# Patient Record
Sex: MALE | Race: WHITE | NOT HISPANIC OR LATINO
[De-identification: names, ages, dates, MRNs, and addresses within clinical notes are randomized per-mention and may not be internally consistent; named-entity substitution may affect disease eponyms.]

---
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
## 2022-04-23 NOTE — ED ADULT NURSE NOTE - CINV DISCH TEACH PARTICIP
[address]  [address] [facility] [address]  [phone]    Work/School Note    Date: 4/23/2022    To Whom It May concern:    Matthew Shi was seen and treated today in the emergency room by the following provider(s):  Attending Provider: Mel Burger MD  Nurse Practitioner: Ketan Geiger NP. Matthew Shi is excused from work/school on 4/23/2022 through 4/25/2022. He is medically clear to return to work/school on 4/26/2022.          Sincerely,          Tracee Montiel NP Patient

## 2022-08-10 ENCOUNTER — EMERGENCY (EMERGENCY)
Facility: HOSPITAL | Age: 22
LOS: 0 days | Discharge: HOME | End: 2022-08-10
Attending: EMERGENCY MEDICINE | Admitting: EMERGENCY MEDICINE

## 2022-08-10 VITALS
DIASTOLIC BLOOD PRESSURE: 71 MMHG | TEMPERATURE: 99 F | RESPIRATION RATE: 17 BRPM | HEART RATE: 96 BPM | OXYGEN SATURATION: 98 % | WEIGHT: 149.03 LBS | SYSTOLIC BLOOD PRESSURE: 119 MMHG
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
## 2022-08-10 DIAGNOSIS — S43.014A ANTERIOR DISLOCATION OF RIGHT HUMERUS, INITIAL ENCOUNTER: ICD-10-CM

## 2022-08-10 DIAGNOSIS — M25.511 PAIN IN RIGHT SHOULDER: ICD-10-CM

## 2022-08-10 PROCEDURE — 73030 X-RAY EXAM OF SHOULDER: CPT | Mod: 26,RT,76

## 2022-08-10 PROCEDURE — 99284 EMERGENCY DEPT VISIT MOD MDM: CPT | Mod: 57

## 2022-08-10 PROCEDURE — 23650 CLTX SHO DSLC W/MNPJ WO ANES: CPT | Mod: 54

## 2022-08-10 RX ORDER — MORPHINE SULFATE 50 MG/1
6 CAPSULE, EXTENDED RELEASE ORAL ONCE
Refills: 0 | Status: DISCONTINUED | OUTPATIENT
Start: 2022-08-10 | End: 2022-08-10

## 2022-08-10 RX ADMIN — MORPHINE SULFATE 6 MILLIGRAM(S): 50 CAPSULE, EXTENDED RELEASE ORAL at 00:55

## 2022-08-10 RX ADMIN — MORPHINE SULFATE 6 MILLIGRAM(S): 50 CAPSULE, EXTENDED RELEASE ORAL at 01:25

## 2022-08-10 NOTE — ED PROVIDER NOTE - OBJECTIVE STATEMENT
22-year-old male with past medical history of 2 previous right shoulder dislocations presents to the ED complaining of moderate right shoulder pain.  Patient slipped getting out of a hot tub reached out to grab onto something with his right arm and suddenly felt a pop in his right shoulder.  Patient states symptoms are similar to previous shoulder dislocation.  Pain is throbbing, constant, worse with movement and mildly improved with rest.  He did not take any medications to improve his symptoms.  He denies other complaints. No fever, chills, numbness.

## 2022-08-10 NOTE — ED PROVIDER NOTE - CARE PROVIDERS DIRECT ADDRESSES
,[email or web address],[email or web address]

## 2022-08-10 NOTE — ED PROVIDER NOTE - CARE PROVIDER_API CALL
YOUR ORTHOPEDIC SURGEON,   Phone: (   )    -  Fax: (   )    -  Follow Up Time: 1-3 Days    Derek Goodman)  [facility]  [address] [place] [address]  Phone: [phone]  Fax: [phone]  Follow Up Time: 4-6 Days

## 2022-08-10 NOTE — ED PROVIDER NOTE - CLINICAL SUMMARY MEDICAL DECISION MAKING FREE TEXT BOX
R shoulder dislocation - intra-articular lidocaine, successful reduced, sling placed, post-redn xr, all results d/w pt & copies given, strict return precautions discussed, rec outpt Ortho f/u

## 2022-08-10 NOTE — ED PROVIDER NOTE - PATIENT PORTAL LINK FT
You can access the FollowMyHealth Patient Portal offered by [facility] by registering at the following website: [email or web address]. By joining [employer]’s FollowMyHealth portal, you will also be able to view your health information using other applications (apps) compatible with our system.

## 2022-08-10 NOTE — ED PROVIDER NOTE - NS ED ROS FT
Review of Systems  Constitutional:  No fever, chills.  MS:  No back pain. (+) R Shoulder pain  Skin:  No skin rash, pruritis, jaundice, or lesions.  Neuro:  No headache, dizziness, loss of sensation, or focal weakness.  No change in mental status.   Endocrine: No history of thyroid disease or diabetes.

## 2022-08-10 NOTE — ED PROVIDER NOTE - PHYSICAL EXAMINATION
VITAL SIGNS: I have reviewed nursing notes and confirm.  CONSTITUTIONAL: Well-developed; well-nourished; in no acute distress.  SKIN: Skin exam is warm and dry, no acute rash.  HEAD: Normocephalic; atraumatic.  EYES: PERRL, EOM intact; conjunctiva and sclera clear.  ENT: No nasal discharge; airway clear.   NECK: Supple; non tender.  CARD: Regular rate and rhythm.  RESP: Speaking in full sentences.   EXT: (+) TTP and limited ROM of R shoulder. Empty glenoid fossa--consistent with shoulder dislocation. No humeral shaft/elbow/forearm/wrist/hand TTP or deformity. Radial pulses 2+ B/L. Sensation intact.   NEURO: Alert, oriented. Grossly unremarkable. No focal deficits.

## 2022-08-10 NOTE — ED PROVIDER NOTE - NS ED ATTENDING STATEMENT MOD
This was a shared visit with the [place]. I reviewed and verified the documentation and independently performed the documented:

## 2022-08-10 NOTE — ED PEDIATRIC TRIAGE NOTE - WEIGHT KG
67.6

## 2022-08-12 ENCOUNTER — APPOINTMENT (OUTPATIENT)
Dept: ORTHOPEDIC SURGERY | Facility: CLINIC | Age: 22
End: 2022-08-12

## 2022-08-12 VITALS — WEIGHT: 147 LBS | HEIGHT: 68 IN | BODY MASS INDEX: 22.28 KG/M2

## 2022-08-12 PROCEDURE — 99213 OFFICE O/P EST LOW 20 MIN: CPT

## 2022-08-12 NOTE — PHYSICAL EXAM
[Normal Coordination] : normal coordination [Normal DTR UE/LE] : normal DTR UE/LE  [Normal Sensation] : normal sensation [Normal Mood and Affect] : normal mood and affect [Orientated] : orientated [Normal Skin] : normal skin [No Rash] : no rash [No Ulcers] : no ulcers [No Lesions] : no lesions [No obvious lymphadenopathy in areas examined] : no obvious lymphadenopathy in areas examined [Right] : right shoulder [] : tenderness to palpation [FreeTextEntry9] :   Range of motion and strength testing deferred due to recent dislocation.  full range of motion and 5/5 strength, neurovascularly intact distal to shoulder

## 2022-08-12 NOTE — ASSESSMENT
[FreeTextEntry1] :  discussed with patient and his mother that since this is his 3rd anterior shoulder dislocation with relatively minimal trauma that we need to get an updated  MRI and have him follow up with 1 of our surgeons to discuss possible surgical correction of the labral tear that is making him more predisposed to these anterior dislocations.  The stat MRI for traumatic tear of the rotator cuff/ worsening of tear to the labrum was ordered for authorization.  He will remain in the sling for the next 2 weeks.  He will come out of the sling few times a day for range of motion of the elbow hand and wrist.  The importance of no overhead movements pushing and pulling with the right upper extremity was reinforced.  He will follow up for surgical consult in the next 2-4 weeks in the next available appointment.  He will continue with OTC NSAIDs as needed for pain.  He expressed understanding and had   Additional questions or concerns.\par \par   Patient's mother is requesting that I call her with the MRI results as well as called the patient.  I can reach her at [phone]\par \par This visit was seen under the direct supervision of Dr. Serafin Kramer

## 2022-08-12 NOTE — DATA REVIEWED
[Right] : of the right [Shoulder] : shoulder [FreeTextEntry1] :  post reduction x-rays showed good anatomic alignment no acute fracture noted

## 2022-08-12 NOTE — HISTORY OF PRESENT ILLNESS
[de-identified] :  patient is a 22-year-old male presenting for evaluation of right shoulder dislocation.  On the 10th August 2022 he slipped while getting out of a hot tub and dislocated his right shoulder.  He was seen in the emergency roomWhere he was reduced successfully.  This is his 3rd shoulder dislocation.  He did have an MRI completed 2 years ago which showed a torn labrum which was treated conservatively.

## 2022-08-20 ENCOUNTER — APPOINTMENT (OUTPATIENT)
Dept: MRI IMAGING | Facility: CLINIC | Age: 22
End: 2022-08-20

## 2022-08-20 PROCEDURE — 73221 MRI JOINT UPR EXTREM W/O DYE: CPT | Mod: RT

## 2022-09-06 ENCOUNTER — APPOINTMENT (OUTPATIENT)
Dept: ORTHOPEDIC SURGERY | Facility: CLINIC | Age: 22
End: 2022-09-06

## 2022-09-06 PROCEDURE — 99204 OFFICE O/P NEW MOD 45 MIN: CPT | Mod: 57

## 2022-09-06 NOTE — HISTORY OF PRESENT ILLNESS
[de-identified] : Patient is here for evaluation of right shoulder pain\par Affecting quality of life\par Wakes up at night due to pain\par \par has had 3 dislocations and has needed reduction in ED\par \par NAD\par Right shoulder:\par TTP ant GH joint, bicipital groove\par FF 0-175\par ER 60\par IR T12\par 5/5 strength scapular abduction, ER, IR\par Pos Impingement\par Pos Cha\par Pos Cross Arm Adduction\par pos instability\par \par XRay right shoulder negative for fracture, dislocation, arthritis\par \par mri right shoulder\par IMPRESSION:\par 1. 1.5 cm osseous Hill-Sachs compression deformity with reactive marrow edema in the posterior humeral head. No current \par dislocation, fracture, or osseous Bankart injury.\par 2. Probable SLAP labral tear with partial avulsion also involving the posterior inferior labrum. This could be further evaluated \par with MR Arthrogram if clinically indicated.\par 3. Minimal inferior glenohumeral joint effusion. Trace biceps tenosynovitis. The rotator cuff is preserved.\par \par Plan\par went over findings\par explained the mri and tear and pathology and chance of recurrance\par since this has happened mutliple times, will proceed with surgery\par right shoulder arthroscopy, labral repair, plication, decompression\par \par Operative and nonoperative options discussed with patient. Surgical risks, benefits, and alternatives explained. Surgical risks include but are not exclusive to bleeding, infection, neurovascular damage, continued pain, stiffness, scarring, rsd, dvt/pe, potential failure of surgery that may require further surgery in the future. I went over incisions and rehabilitation. All questions answered. \par \par \par

## 2022-09-06 NOTE — ED PROCEDURE NOTE - NS ED PERI NEURO NEG
Last OV: 6/8/22  Next OV: 9/7/22 Post-application: Motor, sensory, and vascular responses intact in the injured extremity./Pre-application: Motor, sensory, and vascular responses intact in the injured extremity./The patient/caregiver verbalized understanding of how to care for the injured extremity with splint

## 2022-10-03 ENCOUNTER — OUTPATIENT (OUTPATIENT)
Dept: OUTPATIENT SERVICES | Facility: HOSPITAL | Age: 22
LOS: 1 days | Discharge: HOME | End: 2022-10-03

## 2022-10-03 VITALS
HEIGHT: 68 IN | DIASTOLIC BLOOD PRESSURE: 70 MMHG | HEART RATE: 60 BPM | SYSTOLIC BLOOD PRESSURE: 119 MMHG | WEIGHT: 145.51 LBS | RESPIRATION RATE: 18 BRPM | OXYGEN SATURATION: 98 % | TEMPERATURE: 98 F

## 2022-10-03 DIAGNOSIS — M75.101 UNSPECIFIED ROTATOR CUFF TEAR OR RUPTURE OF RIGHT SHOULDER, NOT SPECIFIED AS TRAUMATIC: ICD-10-CM

## 2022-10-03 DIAGNOSIS — Z01.818 ENCOUNTER FOR OTHER PREPROCEDURAL EXAMINATION: ICD-10-CM

## 2022-10-03 DIAGNOSIS — Z98.890 OTHER SPECIFIED POSTPROCEDURAL STATES: Chronic | ICD-10-CM

## 2022-10-03 NOTE — H&P PST ADULT - HISTORY OF PRESENT ILLNESS
22 year old male here for above, pt has dislocated right shoulder 3 times, most recently 8/10/22, seen in ED, same reduced, pt notes his right shoulder "feels loose", he has full ROM, but when he performs ADLs, he has intermittent pain in arm/shoulder, pt occasionally takes OTC meds, Advil, Tylenol. Pt states same affects ability to work, work out, do strength training, needs procedure  pt denies cp, sob, cheng or palpitations  pt denies urinary frequency, urgency or burning  ex rudy 3-5 fos     Anesthesia Alert  NO--Difficult Airway  NO--History of neck surgery or radiation  NO--Limited ROM of neck  NO--History of Malignant hyperthermia  NO--Personal or family history of Pseudocholinesterase deficiency  NO--Prior Anesthesia Complication  NO--Latex Allergy  NO--Loose teeth  NO--History of Rheumatoid Arthritis  NO--MARGARET  NO--bleeding risks  Pt denies any s/s covid 19 and reports no contact with known positive people. Pt has appointment for repeat covid testing pre op and instructed to continue to self monitor and report any concerns to MD. Pt will continue to practice self isolation and  exposure control measures pre op

## 2022-10-03 NOTE — H&P PST ADULT - MUSCULOSKELETAL COMMENTS
Pt has full ROM of right shoulder, but painful with certain movements such as lifting, opening doors, pt states shoulder feels "loose"

## 2022-10-03 NOTE — H&P PST ADULT - NSICDXFAMILYHX_GEN_ALL_CORE_FT
FAMILY HISTORY:  Father  Still living? Yes, Estimated age: Age Unknown  Family history of type 2 diabetes mellitus in father, Age at diagnosis: 51-60    Mother  Still living? Yes, Estimated age: 41-50  FH: HTN (hypertension), Age at diagnosis: 31-40

## 2022-10-03 NOTE — H&P PST ADULT - MUSCULOSKELETAL
normal/ROM intact/normal gait/strength 5/5 bilateral upper extremities/strength 5/5 bilateral lower extremities/decreased strength details… normal/ROM intact/no joint warmth/no calf tenderness/normal gait/strength 5/5 bilateral upper extremities/strength 5/5 bilateral lower extremities/no chest wall tenderness/decreased strength

## 2022-10-03 NOTE — H&P PST ADULT - CARDIOVASCULAR
regular rate and rhythm/S1 S2 present/no gallops/no rub/no murmur normal/regular rate and rhythm/S1 S2 present/no gallops/no rub/no murmur

## 2022-10-21 ENCOUNTER — LABORATORY RESULT (OUTPATIENT)
Age: 22
End: 2022-10-21

## 2022-10-21 NOTE — ASU PATIENT PROFILE, ADULT - PAIN RATING AT REST
May 15, 2019      Sera Roy, PhD  [address]  [facility] [address]           [place] [facility]  [address]  [facility] [address]  Phone: [phone]  Fax: [phone]          Patient: Jamaal Felton   MR Number: [id number]   YOB: 2003   Date of Visit: 3/26/2019       Dear Sera Roy:    Thank you for referring Jamaal Felton to me for evaluation. Attached you will find relevant portions of my assessment and plan of care.    If you have questions, please do not hesitate to call me. I look forward to following Jamaal Felton along with you.    Sincerely,    Yolanda Lay, PhD    Enclosure  CC:  No Recipients    If you would like to receive this communication electronically, please contact [email or web address] or [phone] to request more information on [employer] access.    For providers and/or their staff who would like to refer a patient to [facility], please contact us through our one-stop-shop provider referral line, [facility], at [phone].    If you feel you have received this communication in error or would no longer like to receive these types of communications, please e-mail [email or web address]          4

## 2022-10-24 ENCOUNTER — APPOINTMENT (OUTPATIENT)
Age: 22
End: 2022-10-24

## 2022-10-24 ENCOUNTER — OUTPATIENT (OUTPATIENT)
Dept: OUTPATIENT SERVICES | Facility: HOSPITAL | Age: 22
LOS: 1 days | Discharge: HOME | End: 2022-10-24

## 2022-10-24 ENCOUNTER — TRANSCRIPTION ENCOUNTER (OUTPATIENT)
Age: 22
End: 2022-10-24

## 2022-10-24 VITALS
HEART RATE: 77 BPM | SYSTOLIC BLOOD PRESSURE: 125 MMHG | RESPIRATION RATE: 18 BRPM | WEIGHT: 145.51 LBS | DIASTOLIC BLOOD PRESSURE: 73 MMHG | TEMPERATURE: 98 F | OXYGEN SATURATION: 97 % | HEIGHT: 68 IN

## 2022-10-24 VITALS
SYSTOLIC BLOOD PRESSURE: 108 MMHG | DIASTOLIC BLOOD PRESSURE: 72 MMHG | HEART RATE: 79 BPM | OXYGEN SATURATION: 97 % | RESPIRATION RATE: 12 BRPM

## 2022-10-24 DIAGNOSIS — Z98.890 OTHER SPECIFIED POSTPROCEDURAL STATES: Chronic | ICD-10-CM

## 2022-10-24 PROCEDURE — 29807 SHO ARTHRS SRG RPR SLAP LES: CPT | Mod: 59,RT

## 2022-10-24 PROCEDURE — 29806 SHO ARTHRS SRG CAPSULORRAPHY: CPT | Mod: RT

## 2022-10-24 RX ORDER — SODIUM CHLORIDE 9 MG/ML
1000 INJECTION, SOLUTION INTRAVENOUS
Refills: 0 | Status: DISCONTINUED | OUTPATIENT
Start: 2022-10-24 | End: 2022-11-07

## 2022-10-24 RX ORDER — IBUPROFEN 200 MG
0 TABLET ORAL
Qty: 0 | Refills: 0 | DISCHARGE

## 2022-10-24 RX ORDER — HYDROMORPHONE HYDROCHLORIDE 2 MG/ML
0.5 INJECTION INTRAMUSCULAR; INTRAVENOUS; SUBCUTANEOUS
Refills: 0 | Status: DISCONTINUED | OUTPATIENT
Start: 2022-10-24 | End: 2022-10-24

## 2022-10-24 RX ORDER — OXYCODONE HYDROCHLORIDE 5 MG/1
5 TABLET ORAL ONCE
Refills: 0 | Status: DISCONTINUED | OUTPATIENT
Start: 2022-10-24 | End: 2022-10-24

## 2022-10-24 RX ORDER — ACETAMINOPHEN 500 MG
650 TABLET ORAL ONCE
Refills: 0 | Status: DISCONTINUED | OUTPATIENT
Start: 2022-10-24 | End: 2022-11-07

## 2022-10-24 RX ADMIN — SODIUM CHLORIDE 100 MILLILITER(S): 9 INJECTION, SOLUTION INTRAVENOUS at 10:47

## 2022-10-24 NOTE — BRIEF OPERATIVE NOTE - NSICDXBRIEFPROCEDURE_GEN_ALL_CORE_FT
PROCEDURES:  Right shoulder arthroscopy 24-Oct-2022 09:17:36  Berto Gage  Repair of superior labral anterior-to-posterior (SLAP) tear of shoulder 24-Oct-2022 09:18:04  Berto Gage

## 2022-10-24 NOTE — CHART NOTE - NSCHARTNOTEFT_GEN_A_CORE
PACU ANESTHESIA ADMISSION NOTE      Procedure:   Post op diagnosis:      ____  Intubated  TV:______       Rate: ______      FiO2: ______    _x___  Patent Airway    __x__  Full return of protective reflexes    _x___  Full recovery from anesthesia / back to baseline     Vitals:   T: 98          R:  12                BP: 129/80                 Sat: 99                  P: 76      Mental Status:  ___x_ Awake   __x___ Alert   _____ Drowsy   _____ Sedated    Nausea/Vomiting:  __x__ NO  ______Yes,   See Post - Op Orders          Pain Scale (0-10):  _____    Treatment: __x__ None    ____ See Post - Op/PCA Orders    Post - Operative Fluids:   ____ Oral   _x___ See Post - Op Orders    Plan: Discharge:   ____Home       __x___Floor     _____Critical Care    _____  Other:_________________    Comments:

## 2022-10-24 NOTE — ASU DISCHARGE PLAN (ADULT/PEDIATRIC) - ASU DC SPECIAL INSTRUCTIONSFT
Post Operative Instructions for Shoulder Surgery    Your Surgery Included  [ ] Rotator Cuff Repair			  [x ] Debridement				  [ ] Biceps Tenodesis/Tenotomy	  [ ] Distal Clavicle Resection		  [x ] SLAP Repair  [x ] Instability Repair  [ ] Lysis of Adhesions/Manipulation  [ ] Other:  	  Call our office ([phone]) immediately if you experience any of the following:  •	Excessive bleeding or pus like drainage at the incision site  •	Uncontrollable pain not relieved by pain medication  •	Excessive swelling or redness at the incision site  •	Fever above 101.5 degrees not controlled with Tylenol or Motrin  •	Shortness of Breath  •	Any foul odor or blistering from the surgery site    Pain Management: You were given one or more of the following medication prescriptions before leaving the hospital. Have the prescriptions filled at a pharmacy on your way home and follow the instructions on the bottles.   Regional Anesthesia Injections (Blocks): You may have been given a regional nerve block either before or after surgery. This may numb your shoulder for 24-36 hours    Diet: Eat a bland diet for the first day after surgery. Progress your diet as tolerated. Constipation may occur with Narcotic usage, contact our office if you are experiencing constipation.    Activity: After you arrive at home, spend most of the first 24 hours resting in bed, on the couch, or in a reclining chair. After the first 24 hours at home, slowly increase your activity level based on your symptoms.    Dressing Change: Remove the dressing on the 3rd day. It is normal for some blood to be seen on the dressings. It is also normal for you to see apparent bruising on the skin around your shoulder when you remove the dressing. If present, leave the steri-strip tape across the incisions. If you are concerned by the drainage or the appearance of your shoulder, please call one of the numbers listed below. Keep incisions covered with Band-Aids/bandages.    Showering: You may shower on the 5th day after surgery if the wound is dry and clean, but do not let the wound soak in water until sutures are removed. Do not submerge in any water until after your postoperative appointment in clinic.    Shoulder Sling: You may have been sent home with a sling / pillow attachment holding your arm away from your body. You may remove the sling when changing clothes or bathing. Make sure to wear the sling while sleeping unless instructed otherwise. You may remove for exercises.    Shoulder Exercises: You may do these exercises for 2-5 mins five times a day in order to help regain your range of motion.  [x ] Shoulder Shrugs: Shrug your shoulders up and down  [x ] Pendulums: Bend forward allowing your arm to hang down in front of you. Gently swing your arm side-to-side and front to back  [x ] Elbow range of motion: Straighten and bend your elbow  [x ] Scapula Retractions: Squeeze shoulder blades together while slightly pulling them down  [ ] Passive Abduction: Have family member lift your arm away from your body bringing your elbow to the level of your shoulder  [ ] Shoulder rotation: With your arm at your side, have a family member rotate your arm internally and externally  [ ] Pulley exercises: Put a towel over the top of a door and face the door, use your good arm to pull your arm up in front of you    Follow Up: As Scheduled

## 2022-10-24 NOTE — ASU DISCHARGE PLAN (ADULT/PEDIATRIC) - NS MD DC FALL RISK RISK
For information on Fall & Injury Prevention, visit: [email or web address] OR  [email or web address] OR  https://www.cdc.gov/steadi/patient.html

## 2022-10-24 NOTE — BRIEF OPERATIVE NOTE - NSICDXBRIEFPREOP_GEN_ALL_CORE_FT
PRE-OP DIAGNOSIS:  Superior labrum anterior-to-posterior (SLAP) tear of right shoulder 24-Oct-2022 09:19:31  Berto Gage

## 2022-10-24 NOTE — BRIEF OPERATIVE NOTE - NSICDXBRIEFPOSTOP_GEN_ALL_CORE_FT
POST-OP DIAGNOSIS:  Superior labrum anterior-to-posterior (SLAP) tear of right shoulder 24-Oct-2022 09:19:34  Berto Gage
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
## 2022-10-26 DIAGNOSIS — M25.311 OTHER INSTABILITY, RIGHT SHOULDER: ICD-10-CM
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
## 2022-10-26 DIAGNOSIS — S43.431A SUPERIOR GLENOID LABRUM LESION OF RIGHT SHOULDER, INITIAL ENCOUNTER: ICD-10-CM

## 2022-10-26 DIAGNOSIS — M65.811 OTHER SYNOVITIS AND TENOSYNOVITIS, RIGHT SHOULDER: ICD-10-CM
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
## 2022-10-26 DIAGNOSIS — F17.210 NICOTINE DEPENDENCE, CIGARETTES, UNCOMPLICATED: ICD-10-CM

## 2022-11-01 ENCOUNTER — APPOINTMENT (OUTPATIENT)
Dept: ORTHOPEDIC SURGERY | Facility: CLINIC | Age: 22
End: 2022-11-01

## 2022-11-01 PROCEDURE — 99024 POSTOP FOLLOW-UP VISIT: CPT

## 2022-11-01 NOTE — HISTORY OF PRESENT ILLNESS
[de-identified] : Pt is s/p right shoulder arthroscopy\par Doing well.\par Pain controlled\par \par NAD\par Right shoulder\par Incisions healed\par Mild diffuse TTP\par Compartments soft and NT\par ROM limited secondary to pain\par NVI\par \par s/p right shoulder arthroscopy\par went over the surgery in detail\par will start pt, protocol provided\par continue pain control as needed\par fu in 1 month\par all questions answered\par

## 2022-12-13 ENCOUNTER — APPOINTMENT (OUTPATIENT)
Dept: ORTHOPEDIC SURGERY | Facility: CLINIC | Age: 22
End: 2022-12-13

## 2022-12-13 PROCEDURE — 99024 POSTOP FOLLOW-UP VISIT: CPT

## 2022-12-13 NOTE — HISTORY OF PRESENT ILLNESS
[de-identified] : Pt is s/p right shoulder arthroscopy\par Doing well.\par Pain controlled\par happy with progress\par \par NAD\par Right shoulder\par Incisions healed\par Mild diffuse TTP\par Compartments soft and NT\par nearly FROM\par NVI\par \par s/p right shoulder arthroscopy\par went over the surgery in detail\par cont to progress\par fu in 2 months

## 2023-02-20 ENCOUNTER — APPOINTMENT (OUTPATIENT)
Dept: ORTHOPEDIC SURGERY | Facility: CLINIC | Age: 23
End: 2023-02-20
Payer: COMMERCIAL

## 2023-02-20 DIAGNOSIS — M25.311 OTHER INSTABILITY, RIGHT SHOULDER: ICD-10-CM

## 2023-02-20 PROCEDURE — 99213 OFFICE O/P EST LOW 20 MIN: CPT

## 2023-02-20 RX ORDER — TRIAMCINOLONE ACETONIDE 1 MG/G
0.1 CREAM TOPICAL
Qty: 15 | Refills: 0 | Status: DISCONTINUED | COMMUNITY
Start: 2022-12-02

## 2023-02-20 RX ORDER — OXYCODONE AND ACETAMINOPHEN 5; 325 MG/1; MG/1
5-325 TABLET ORAL
Qty: 30 | Refills: 0 | Status: DISCONTINUED | COMMUNITY
Start: 2022-10-24

## 2023-02-20 NOTE — HISTORY OF PRESENT ILLNESS
[de-identified] : The patient is a 22-year-old male here for a subsequent reevaluation of his right shoulder.  He is status post right shoulder arthroscopy, labral repair, anterior inferior capsulorrhaphy, arthroscopic extensive debridement of the rotator cuff, labrum, biceps anchor, synovitis and bursitis on 10/24/2022.  He is almost 4 months out from his surgery.  He is doing well.  He is doing formal physical therapy at [facility].  He reports increased range of motion.  He still has some weakness in the shoulder.

## 2023-02-20 NOTE — PHYSICAL EXAM
[Right] : right shoulder [] : motor and sensory intact distally [de-identified] : Weakness with rotator cuff resistance testing and [place]'s testing. [TWNoteComboBox7] : active forward flexion 180 degrees [TWNoteComboBox6] : internal rotation L1 [de-identified] : active abduction 160 degrees

## 2023-02-20 NOTE — DISCUSSION/SUMMARY
[de-identified] : I recommend he continues with formal physical therapy, especially for rotator cuff and deltoid strengthening.  He will follow-up in 2 months for further evaluation with Dr. Goodman.\par \par Supervising Physician: Dr. Goodman

## 2023-04-27 ENCOUNTER — APPOINTMENT (OUTPATIENT)
Dept: ORTHOPEDIC SURGERY | Facility: CLINIC | Age: 23
End: 2023-04-27
Payer: COMMERCIAL

## 2023-04-27 DIAGNOSIS — S43.004A UNSPECIFIED DISLOCATION OF RIGHT SHOULDER JOINT, INITIAL ENCOUNTER: ICD-10-CM

## 2023-04-27 PROCEDURE — 99213 OFFICE O/P EST LOW 20 MIN: CPT

## 2023-04-27 NOTE — HISTORY OF PRESENT ILLNESS
[de-identified] : Pt is s/p right shoulder arthroscopy\par Doing well.\par Pain controlled\par happy with progress\par \par NAD\par Right shoulder\par Incisions healed\par Mild diffuse TTP\par Compartments soft and NT\par nearly FROM, slight tightness with ER at 90 deg abd\par NVI\par \par s/p right shoulder arthroscopy\par went over the surgery in detail\par cont to progress\par can return to full activities\par fu as needed

## 2023-09-25 NOTE — ED PROVIDER NOTE - PROVIDER TOKENS
Patients daughter n  called and stated that patient is having diarrhea with dark black stool.  Please advise on getting her in sooner than November with Patsy Blackburn.  Thank you   FREE:[LAST:[YOUR ORTHOPEDIC SURGEON],PHONE:[(   )    -],FAX:[(   )    -],FOLLOWUP:[1-3 Days]],PROVIDER:[TOKEN:[[id number]],FOLLOWUP:[4-6 Days]]

## 2025-05-19 NOTE — H&P PST ADULT - PRO PAIN EXPRESSION
Chart reviewed.  He was just recently on antibiotics.  Suggest for cough  to use albuterol 2 puffs four times a day x 2 days then 2 puffs four times a day as needed.   Call with worsening symptoms such as increased shortness of breath, productive cough, wheezing or symptoms not responding to treatment plan.   Suggest cancelling elective tooth pulling until cough improves.       verbalization
